# Patient Record
Sex: MALE | Race: WHITE | NOT HISPANIC OR LATINO | Employment: FULL TIME
[De-identification: names, ages, dates, MRNs, and addresses within clinical notes are randomized per-mention and may not be internally consistent; named-entity substitution may affect disease eponyms.]

---

## 2020-03-02 ENCOUNTER — HEALTH MAINTENANCE LETTER (OUTPATIENT)
Age: 40
End: 2020-03-02

## 2020-12-14 ENCOUNTER — HEALTH MAINTENANCE LETTER (OUTPATIENT)
Age: 40
End: 2020-12-14

## 2021-04-18 ENCOUNTER — HEALTH MAINTENANCE LETTER (OUTPATIENT)
Age: 41
End: 2021-04-18

## 2021-10-02 ENCOUNTER — HEALTH MAINTENANCE LETTER (OUTPATIENT)
Age: 41
End: 2021-10-02

## 2022-02-14 ENCOUNTER — TELEPHONE (OUTPATIENT)
Dept: FAMILY MEDICINE | Facility: CLINIC | Age: 42
End: 2022-02-14
Payer: COMMERCIAL

## 2022-02-14 NOTE — TELEPHONE ENCOUNTER
I assessed patient over the phone and he had a small amount of bloody sputum. The blood was separate from his saliva.  He has had a sore throat with a cough for a few days.  He suspects that blood was from irritated throat.  Cough is much better today.   No fever  No shortness of breath or chest pain.  He will go to urgent care if he has more blood when he coughs.  He was encourage to make an appt for a Covid 19 test.  He says he has not been exposed in the last few weeks.

## 2022-02-14 NOTE — TELEPHONE ENCOUNTER
Reason for call:  Patient reporting a symptom    Symptom or request: Coughing    Duration (how long have symptoms been present): Today, 2/14/22    Have you been treated for this before? No    Additional comments: Patient may have had Covid, wasn't tested since summer 2021 and hasnt seen a doctor in years and not vaccinated. Patient was coughing was really bad, but has gotten bettter. Patient went to cough, then had blood in mouth.    Phone Number patient can be reached at:  Home number on file [phone] (home)    Best Time:  Any    Can we leave a detailed message on this number:  YES    Call taken on 2/14/2022 at 4:12 PM by Cynthia Martinez

## 2022-05-14 ENCOUNTER — HEALTH MAINTENANCE LETTER (OUTPATIENT)
Age: 42
End: 2022-05-14

## 2022-09-03 ENCOUNTER — HEALTH MAINTENANCE LETTER (OUTPATIENT)
Age: 42
End: 2022-09-03

## 2023-01-23 ENCOUNTER — HOSPITAL ENCOUNTER (EMERGENCY)
Facility: CLINIC | Age: 43
Discharge: HOME | End: 2023-01-23
Attending: FAMILY MEDICINE | Admitting: FAMILY MEDICINE
Payer: COMMERCIAL

## 2023-01-23 ENCOUNTER — APPOINTMENT (OUTPATIENT)
Dept: CT IMAGING | Facility: CLINIC | Age: 43
End: 2023-01-23
Attending: FAMILY MEDICINE
Payer: COMMERCIAL

## 2023-01-23 VITALS
DIASTOLIC BLOOD PRESSURE: 99 MMHG | WEIGHT: 239.2 LBS | SYSTOLIC BLOOD PRESSURE: 168 MMHG | HEART RATE: 78 BPM | RESPIRATION RATE: 14 BRPM | BODY MASS INDEX: 34.24 KG/M2 | OXYGEN SATURATION: 94 % | TEMPERATURE: 96.8 F | HEIGHT: 70 IN

## 2023-01-23 DIAGNOSIS — I10 BENIGN ESSENTIAL HYPERTENSION: ICD-10-CM

## 2023-01-23 DIAGNOSIS — H81.10 BENIGN PAROXYSMAL POSITIONAL VERTIGO, UNSPECIFIED LATERALITY: ICD-10-CM

## 2023-01-23 LAB
ANION GAP SERPL CALCULATED.3IONS-SCNC: 13 MMOL/L (ref 7–15)
BASOPHILS # BLD AUTO: 0.1 10E3/UL (ref 0–0.2)
BASOPHILS NFR BLD AUTO: 1 %
BUN SERPL-MCNC: 14.7 MG/DL (ref 6–20)
CALCIUM SERPL-MCNC: 9.7 MG/DL (ref 8.6–10)
CHLORIDE SERPL-SCNC: 101 MMOL/L (ref 98–107)
CREAT SERPL-MCNC: 1.01 MG/DL (ref 0.67–1.17)
DEPRECATED HCO3 PLAS-SCNC: 27 MMOL/L (ref 22–29)
EOSINOPHIL # BLD AUTO: 0.2 10E3/UL (ref 0–0.7)
EOSINOPHIL NFR BLD AUTO: 2 %
ERYTHROCYTE [DISTWIDTH] IN BLOOD BY AUTOMATED COUNT: 12.3 % (ref 10–15)
GFR SERPL CREATININE-BSD FRML MDRD: >90 ML/MIN/1.73M2
GLUCOSE BLDC GLUCOMTR-MCNC: 124 MG/DL (ref 70–99)
GLUCOSE SERPL-MCNC: 126 MG/DL (ref 70–99)
HCT VFR BLD AUTO: 50.8 % (ref 40–53)
HGB BLD-MCNC: 17 G/DL (ref 13.3–17.7)
HOLD SPECIMEN: NORMAL
IMM GRANULOCYTES # BLD: 0 10E3/UL
IMM GRANULOCYTES NFR BLD: 0 %
LYMPHOCYTES # BLD AUTO: 1.9 10E3/UL (ref 0.8–5.3)
LYMPHOCYTES NFR BLD AUTO: 24 %
MCH RBC QN AUTO: 31.4 PG (ref 26.5–33)
MCHC RBC AUTO-ENTMCNC: 33.5 G/DL (ref 31.5–36.5)
MCV RBC AUTO: 94 FL (ref 78–100)
MONOCYTES # BLD AUTO: 0.6 10E3/UL (ref 0–1.3)
MONOCYTES NFR BLD AUTO: 8 %
NEUTROPHILS # BLD AUTO: 5.4 10E3/UL (ref 1.6–8.3)
NEUTROPHILS NFR BLD AUTO: 65 %
NRBC # BLD AUTO: 0 10E3/UL
NRBC BLD AUTO-RTO: 0 /100
PLATELET # BLD AUTO: 335 10E3/UL (ref 150–450)
POTASSIUM SERPL-SCNC: 3.6 MMOL/L (ref 3.4–5.3)
RBC # BLD AUTO: 5.42 10E6/UL (ref 4.4–5.9)
SODIUM SERPL-SCNC: 141 MMOL/L (ref 136–145)
WBC # BLD AUTO: 8.2 10E3/UL (ref 4–11)

## 2023-01-23 PROCEDURE — 99285 EMERGENCY DEPT VISIT HI MDM: CPT | Mod: 25 | Performed by: FAMILY MEDICINE

## 2023-01-23 PROCEDURE — 80048 BASIC METABOLIC PNL TOTAL CA: CPT | Performed by: FAMILY MEDICINE

## 2023-01-23 PROCEDURE — 82962 GLUCOSE BLOOD TEST: CPT

## 2023-01-23 PROCEDURE — 93005 ELECTROCARDIOGRAM TRACING: CPT | Performed by: FAMILY MEDICINE

## 2023-01-23 PROCEDURE — 250N000009 HC RX 250: Performed by: FAMILY MEDICINE

## 2023-01-23 PROCEDURE — 250N000011 HC RX IP 250 OP 636: Performed by: FAMILY MEDICINE

## 2023-01-23 PROCEDURE — 70450 CT HEAD/BRAIN W/O DYE: CPT

## 2023-01-23 PROCEDURE — 85025 COMPLETE CBC W/AUTO DIFF WBC: CPT | Performed by: FAMILY MEDICINE

## 2023-01-23 PROCEDURE — 36415 COLL VENOUS BLD VENIPUNCTURE: CPT | Performed by: FAMILY MEDICINE

## 2023-01-23 PROCEDURE — 93010 ELECTROCARDIOGRAM REPORT: CPT | Performed by: FAMILY MEDICINE

## 2023-01-23 PROCEDURE — 70498 CT ANGIOGRAPHY NECK: CPT

## 2023-01-23 PROCEDURE — 99284 EMERGENCY DEPT VISIT MOD MDM: CPT | Mod: 25 | Performed by: FAMILY MEDICINE

## 2023-01-23 PROCEDURE — 250N000013 HC RX MED GY IP 250 OP 250 PS 637: Performed by: FAMILY MEDICINE

## 2023-01-23 RX ORDER — MECLIZINE HYDROCHLORIDE 25 MG/1
50 TABLET ORAL ONCE
Status: COMPLETED | OUTPATIENT
Start: 2023-01-23 | End: 2023-01-23

## 2023-01-23 RX ORDER — CLONIDINE HYDROCHLORIDE 0.1 MG/1
0.1 TABLET ORAL ONCE
Status: COMPLETED | OUTPATIENT
Start: 2023-01-23 | End: 2023-01-23

## 2023-01-23 RX ORDER — MECLIZINE HYDROCHLORIDE 25 MG/1
25-50 TABLET ORAL 3 TIMES DAILY PRN
Qty: 20 TABLET | Refills: 0 | Status: SHIPPED | OUTPATIENT
Start: 2023-01-23

## 2023-01-23 RX ORDER — IOPAMIDOL 755 MG/ML
68 INJECTION, SOLUTION INTRAVASCULAR ONCE
Status: COMPLETED | OUTPATIENT
Start: 2023-01-23 | End: 2023-01-23

## 2023-01-23 RX ORDER — LOSARTAN POTASSIUM 25 MG/1
25 TABLET ORAL DAILY
Qty: 30 TABLET | Refills: 0 | Status: SHIPPED | OUTPATIENT
Start: 2023-01-23 | End: 2023-01-31

## 2023-01-23 RX ADMIN — SODIUM CHLORIDE 100 ML: 9 INJECTION, SOLUTION INTRAVENOUS at 08:25

## 2023-01-23 RX ADMIN — MECLIZINE HYDROCHLORIDE 50 MG: 25 TABLET ORAL at 09:33

## 2023-01-23 RX ADMIN — CLONIDINE HYDROCHLORIDE 0.1 MG: 0.1 TABLET ORAL at 10:54

## 2023-01-23 RX ADMIN — IOPAMIDOL 68 ML: 755 INJECTION, SOLUTION INTRAVENOUS at 08:25

## 2023-01-23 ASSESSMENT — ACTIVITIES OF DAILY LIVING (ADL)
ADLS_ACUITY_SCORE: 35
ADLS_ACUITY_SCORE: 35

## 2023-01-23 ASSESSMENT — VISUAL ACUITY: OU: BLURRED VISION

## 2023-01-23 NOTE — ED TRIAGE NOTES
Pt here with dizziness with moving his head since yesterday morning around 0930. Pt also reports nausea and elevated BP. BP in triage 190/107. AARON yesterday morning which has resolved.      Triage Assessment     Row Name 01/23/23 0756       Triage Assessment (Adult)    Airway WDL WDL       Cardiac WDL    Cardiac WDL WDL       Cognitive/Neuro/Behavioral WDL    Cognitive/Neuro/Behavioral WDL WDL

## 2023-01-23 NOTE — ED PROVIDER NOTES
History     Chief Complaint   Patient presents with     Dizziness     Started yesterday morning, pt is dizzy when he turns his head     Nausea     HPI  Titi Lobo is a 42 year old male, past medical history is unremarkable, presents to the emergency department concerns of dizziness beginning approximately 24 hours prior to presentation and nausea.  History is obtained from the patient who presents with concerns of room spinning vertigo with head movement beginning yesterday morning shortly after awakening.  The patient states that prior to yesterday morning he went to bed feeling okay.  He awoke with sensation of off-balance room spinning with any head movement side to side and also sensation of his eyes twitching when this happens.  He remains completely at rest is usually not present.  Yesterday morning when it started it was associated with a right periorbital headache that was sharp and piercing that resolved in a few hours without intervention.  There was some nausea at that time that improved.  The patient notes that he checked his blood pressure yesterday at home and found it was over 160 systolic and about 110 diastolic.  He has been told he has had elevated blood pressure without a formal diagnosis of hypertension in the past.  That was many years ago.  At no point was there any chest pain tightness of breath or shortness of air.    Allergies:  No Known Allergies    Problem List:    Patient Active Problem List    Diagnosis Date Noted     Elevated blood pressure reading without diagnosis of hypertension 11/10/2016     Priority: Medium        Past Medical History:    No past medical history on file.    Past Surgical History:    No past surgical history on file.    Family History:    Family History   Problem Relation Age of Onset     Coronary Artery Disease Mother      Hypertension Mother      Hypertension Father      Hypertension Brother      Diabetes Maternal Uncle      Hypertension Maternal Uncle   "    Coronary Artery Disease Maternal Uncle        Social History:  Marital Status:   [2]  Social History     Tobacco Use     Smoking status: Never     Smokeless tobacco: Never   Substance Use Topics     Alcohol use: Yes     Comment: 4-6 beers on weekends     Drug use: No        Medications:    losartan (COZAAR) 25 MG tablet  meclizine (ANTIVERT) 25 MG tablet  Omeprazole (PRILOSEC PO)          Review of Systems   All other systems reviewed and are negative.      Physical Exam   BP: (!) 190/107  Pulse: 67  Temp: 96.8  F (36  C)  Resp: 18  Height: 177.8 cm (5' 10\")  Weight: 108.5 kg (239 lb 3.2 oz)  SpO2: 96 %      Physical Exam  Vitals and nursing note reviewed.   Constitutional:       General: He is not in acute distress.     Appearance: Normal appearance. He is not ill-appearing.   HENT:      Head: Normocephalic and atraumatic.      Comments: The patient has 2 or 3 beats of nystagmus with rotation of the head to the right nystagmus in that direction.     Right Ear: Tympanic membrane, ear canal and external ear normal.      Left Ear: Tympanic membrane, ear canal and external ear normal.      Nose: Nose normal.      Mouth/Throat:      Mouth: Mucous membranes are dry.      Pharynx: Oropharynx is clear.   Eyes:      Extraocular Movements: Extraocular movements intact.      Conjunctiva/sclera: Conjunctivae normal.      Pupils: Pupils are equal, round, and reactive to light.   Cardiovascular:      Rate and Rhythm: Normal rate and regular rhythm.      Pulses: Normal pulses.      Heart sounds: Normal heart sounds.   Pulmonary:      Effort: Pulmonary effort is normal.   Abdominal:      General: Bowel sounds are normal.      Palpations: Abdomen is soft.   Musculoskeletal:         General: Normal range of motion.      Cervical back: Normal range of motion and neck supple.   Skin:     General: Skin is warm and dry.      Capillary Refill: Capillary refill takes less than 2 seconds.   Neurological:      General: No focal " deficit present.      Mental Status: He is alert and oriented to person, place, and time.   Psychiatric:         Mood and Affect: Mood normal.         Behavior: Behavior normal.         ED Course                 Procedures              EKG Interpretation:      Interpreted by Matt Navarro MD  Time reviewed: Time obtained 806 time interpreted same.  65 bpm sinus rhythm no acute ST-T wave changes.  Normal axis and normal intervals.          Critical Care time:  none               Results for orders placed or performed during the hospital encounter of 01/23/23 (from the past 24 hour(s))   Glucose by meter   Result Value Ref Range    GLUCOSE BY METER POCT 124 (H) 70 - 99 mg/dL   [place] Draw    Narrative    The following orders were created for panel order [place] Draw.  Procedure                               Abnormality         Status                     ---------                               -----------         ------                     Extra Blue Top Tube[[id number]]                              Final result               Extra Red Top Tube[[id number]]                               Final result               Extra Green Top (Lithium...[[id number]]                      Final result               Extra Purple Top Tube[[id number]]                            Final result                 Please view results for these tests on the individual orders.   CBC with platelets, differential    Narrative    The following orders were created for panel order CBC with platelets, differential.  Procedure                               Abnormality         Status                     ---------                               -----------         ------                     CBC with platelets and d...[[id number]]                      Final result                 Please view results for these tests on the individual orders.   Basic metabolic panel   Result Value Ref Range    Sodium 141 136 - 145 mmol/L    Potassium 3.6 3.4 - 5.3 mmol/L     Chloride 101 98 - 107 mmol/L    Carbon Dioxide (CO2) 27 22 - 29 mmol/L    Anion Gap 13 7 - 15 mmol/L    Urea Nitrogen 14.7 6.0 - 20.0 mg/dL    Creatinine 1.01 0.67 - 1.17 mg/dL    Calcium 9.7 8.6 - 10.0 mg/dL    Glucose 126 (H) 70 - 99 mg/dL    GFR Estimate >90 >60 mL/min/1.73m2   Extra Blue Top Tube   Result Value Ref Range    Hold Specimen JIC    Extra Red Top Tube   Result Value Ref Range    Hold Specimen JIC    Extra Green Top (Lithium Heparin) Tube   Result Value Ref Range    Hold Specimen JIC    Extra Purple Top Tube   Result Value Ref Range    Hold Specimen JIC    CBC with platelets and differential   Result Value Ref Range    WBC Count 8.2 4.0 - 11.0 10e3/uL    RBC Count 5.42 4.40 - 5.90 10e6/uL    Hemoglobin 17.0 13.3 - 17.7 g/dL    Hematocrit 50.8 40.0 - 53.0 %    MCV 94 78 - 100 fL    MCH 31.4 26.5 - 33.0 pg    MCHC 33.5 31.5 - 36.5 g/dL    RDW 12.3 10.0 - 15.0 %    Platelet Count 335 150 - 450 10e3/uL    % Neutrophils 65 %    % Lymphocytes 24 %    % Monocytes 8 %    % Eosinophils 2 %    % Basophils 1 %    % Immature Granulocytes 0 %    NRBCs per 100 WBC 0 <1 /100    Absolute Neutrophils 5.4 1.6 - 8.3 10e3/uL    Absolute Lymphocytes 1.9 0.8 - 5.3 10e3/uL    Absolute Monocytes 0.6 0.0 - 1.3 10e3/uL    Absolute Eosinophils 0.2 0.0 - 0.7 10e3/uL    Absolute Basophils 0.1 0.0 - 0.2 10e3/uL    Absolute Immature Granulocytes 0.0 <=0.4 10e3/uL    Absolute NRBCs 0.0 10e3/uL   CT Head w/o Contrast    Narrative    CT SCAN OF THE HEAD WITHOUT CONTRAST   1/23/2023 8:42 AM     HISTORY: Acute headache (< 3 months), no complicating features.    TECHNIQUE:  Axial images of the head and coronal reformations without  IV contrast material. Radiation dose for this scan was reduced using  automated exposure control, adjustment of the mA and/or kV according  to patient size, or iterative reconstruction technique.    COMPARISON: None.    FINDINGS: There is no evidence of intracranial hemorrhage, mass, acute  infarct or anomaly.  The ventricles are normal in size, shape and  configuration. The brain parenchyma and subarachnoid spaces are  normal.     The visualized portions of the sinuses and mastoids appear normal. The  bony calvarium and bones of the skull base appear intact.       Impression    IMPRESSION:   No evidence of acute intracranial hemorrhage, mass, or  herniation.    TUAN ORDOÑEZ MD         SYSTEM ID:  [id number]   CTA Head Neck with Contrast    Narrative    CT ANGIOGRAM OF THE HEAD AND NECK WITH CONTRAST  1/23/2023 8:43 AM     HISTORY: Acute headache (< 3 months), no complicating features     TECHNIQUE:  CT angiography with an injection of 68 mL Isovue 370 IV  with scans through the head and neck. Images were transferred to a  separate 3-D workstation where multiplanar reformations and 3-D images  were created. Estimates of carotid stenoses are made relative to the  distal internal carotid artery diameters except as noted. Radiation  dose for this scan was reduced using automated exposure control,  adjustment of the mA and/or kV according to patient size, or iterative  reconstruction technique.    COMPARISON: CT head same day.     CT HEAD FINDINGS: No contrast enhancing lesions. Cerebral blood flow  is grossly normal.     CT ANGIOGRAM HEAD FINDINGS:  The major intracranial arteries including  the proximal branches of the anterior cerebral, middle cerebral, and  posterior cerebral arteries appear patent without vascular cutoff. No  aneurysm or high flow vascular malformation identified. The origin of  the right posterior cerebral artery from the anterior circulation,  normal variant. No high-grade proximal arterial stenosis or large  vessel occlusion. Venous circulation is unremarkable.     CT ANGIOGRAM NECK FINDINGS:   Common origin of the brachiocephalic and left common carotid arteries  from the aortic arch, a normal variant. No stenosis at the origins of  the great vessels.    Right carotid artery: The right common and  internal carotid arteries  are patent. Mild atherosclerotic disease at the carotid bifurcation  and proximal internal carotid artery without significant stenosis by  NASCET criteria.     Left carotid artery: The left common and internal carotid arteries are  patent. Mild atherosclerotic disease at the carotid bifurcation and  proximal internal carotid artery without significant stenosis by  NASCET criteria.     Vertebral arteries: Vertebral arteries are patent without evidence of  dissection. No significant stenosis.     Other findings: Degenerative changes are noted in the visualized  spine.       Impression    IMPRESSION:  1. No high-grade stenosis or large vessel occlusion of the major  intracranial arteries.  2. No intracranial aneurysm or high flow vascular malformation.  3. Mild bilateral carotid bifurcation atherosclerosis without  significant stenosis.  4. Patent cervical vertebral arteries without significant stenosis or  evidence for dissection.    TUAN ORDOÑEZ MD         SYSTEM ID:  [id number]     9:10 AM  No significant lab abnormals and imaging is negative.  We will try 50 mg oral Antivert and observe.    12:46 PM  Definite improvement in vertigo symptoms with the use of the Antivert.  Blood pressure and gradual improvement with the clonidine.  Discussed all diagnostic test resulted during the time of the ER visit.  No concerning findings acutely.  This patient will likely require ongoing blood pressure control and have initiated losartan 25 mg p.o. daily and recommended establishment with primary care and for follow-up of elevated blood pressure in the next 1 to 2 weeks.  I expect that this will need some dose adjustment.  With respect to the vertigo the Antivert has been effective and I will give the patient a prescription for that.  I suspect that this vertigo has a peripheral cause and likely represents BPPV.  Return criteria for the emergency department were discussed    Medications   iopamidol  (ISOVUE-370) solution 68 mL (68 mLs Intravenous Given 1/23/23 0825)   sodium chloride 0.9 % bag 500mL for CT scan flush use (100 mLs Intravenous Given 1/23/23 0825)   meclizine (ANTIVERT) tablet 50 mg (50 mg Oral Given 1/23/23 0933)   cloNIDine (CATAPRES) tablet 0.1 mg (0.1 mg Oral Not Given 1/23/23 1042)   cloNIDine (CATAPRES) tablet 0.1 mg (0.1 mg Oral Given 1/23/23 1054)       Assessments & Plan (with Medical Decision Making)   Assessments and plan with medical decision making at the time stamp above.      Disclaimer: This note consists of symbols derived from keyboarding, dictation and/or voice recognition software. As a result, there may be errors in the script that have gone undetected. Please consider this when interpreting information found in this chart.      I have reviewed the nursing notes.    I have reviewed the findings, diagnosis, plan and need for follow up with the patient.           New Prescriptions    LOSARTAN (COZAAR) 25 MG TABLET    Take 1 tablet (25 mg) by mouth daily for 30 days    MECLIZINE (ANTIVERT) 25 MG TABLET    Take 1-2 tablets (25-50 mg) by mouth 3 times daily as needed for dizziness       Final diagnoses:   Benign paroxysmal positional vertigo, unspecified laterality   Benign essential hypertension       1/23/2023   [facility] EMERGENCY DEPT     Matt Navarro MD  01/23/23 5516

## 2023-01-23 NOTE — DISCHARGE INSTRUCTIONS
Losartan 25 mg p.o. daily to start for your blood pressure.  As we discussed this will likely need to be adjusted.  Will need to follow-up in clinic to establish care and for management of your blood pressure in the next 1 to 2 weeks.  I would recommend Dr. Bert Jose and contact information given at discharge.  Antivert as needed for symptom management of the BPPV.  Return to the emergency department if worse or changes.

## 2023-01-23 NOTE — ED NOTES
Pt woke up at 0630 yesterday with vertigo and a headache.  Stayed in bed much of the day.  Checked his own Blood pressure at home, stated it was elevated. Pt complains of nausea without vomiting.  Left shoulder pain noticed this morning upon waking, attributes to laying in bed most of day yesterday.

## 2023-01-31 ENCOUNTER — OFFICE VISIT (OUTPATIENT)
Dept: FAMILY MEDICINE | Facility: CLINIC | Age: 43
End: 2023-01-31
Payer: COMMERCIAL

## 2023-01-31 VITALS
WEIGHT: 235.8 LBS | DIASTOLIC BLOOD PRESSURE: 80 MMHG | BODY MASS INDEX: 33.76 KG/M2 | HEART RATE: 69 BPM | RESPIRATION RATE: 16 BRPM | OXYGEN SATURATION: 97 % | SYSTOLIC BLOOD PRESSURE: 116 MMHG | HEIGHT: 70 IN | TEMPERATURE: 96.3 F

## 2023-01-31 DIAGNOSIS — Z11.59 NEED FOR HEPATITIS C SCREENING TEST: ICD-10-CM

## 2023-01-31 DIAGNOSIS — R73.09 ELEVATED GLUCOSE: ICD-10-CM

## 2023-01-31 DIAGNOSIS — H81.10 BENIGN PAROXYSMAL POSITIONAL VERTIGO, UNSPECIFIED LATERALITY: ICD-10-CM

## 2023-01-31 DIAGNOSIS — Z13.220 SCREENING FOR HYPERLIPIDEMIA: ICD-10-CM

## 2023-01-31 DIAGNOSIS — Z11.4 SCREENING FOR HIV (HUMAN IMMUNODEFICIENCY VIRUS): ICD-10-CM

## 2023-01-31 DIAGNOSIS — I10 BENIGN ESSENTIAL HYPERTENSION: Primary | ICD-10-CM

## 2023-01-31 PROCEDURE — 99204 OFFICE O/P NEW MOD 45 MIN: CPT | Performed by: FAMILY MEDICINE

## 2023-01-31 RX ORDER — LOSARTAN POTASSIUM 25 MG/1
25 TABLET ORAL DAILY
Qty: 90 TABLET | Refills: 3 | Status: SHIPPED | OUTPATIENT
Start: 2023-01-31

## 2023-01-31 ASSESSMENT — PAIN SCALES - GENERAL: PAINLEVEL: NO PAIN (0)

## 2023-01-31 NOTE — PATIENT INSTRUCTIONS
Be consistent with low salt, low trans fat and low saturated fat diet.  Eat food rich in omega-3-fatty acids as you tolerate. (salmon, olive oil)  Eat 5 cups of vegetables, fruits and whole grains per day.  Limit starchy food (white rice, white bread, white pasta, white potatoes) to less than a cup per meal.  Minimize sweets, junk food and fastfood. Limit soda beverages to one serving per day; best to avoid it altogether though.    Exercise: moderate intensity sustained for at least 30 mins per episode, goal of 150 mins per week at least  Combine cardiovascular and resistance exercises.  These exercise recommendations are in addition to your daily activity at work or home.  Work on losing weight.    Schedule fasting lab appointment.    Think about screening for hepatitis C and HIV infection.    Schedule wellness visit within the next 2 months or so.

## 2023-01-31 NOTE — PROGRESS NOTES
"  Assessment & Plan     Benign essential hypertension  Controlled.  Low salt, low fat diet.   Exercise as tolerated.  Take meds as prescribed; call if with side effects.   - losartan (COZAAR) 25 MG tablet  Dispense: 90 tablet; Refill: 3    Benign paroxysmal positional vertigo, unspecified laterality  Resolved per patient.  No additional treawtment for now.    Elevated glucose  Verified with patient his prandial status on the ER visit.   Will verify level together with A1c on a fasting lab appointment.  - Glucose  - Hemoglobin A1c    Screening for HIV (human immunodeficiency virus)  Need for hepatitis C screening test  Patient will consider this for his wellness exam    Screening for hyperlipidemia  - Lipid panel reflex to direct LDL Fasting   MED REC REQUIRED  Post Medication Reconciliation Status: discharge medications reconciled, continue medications without change  BMI:   Estimated body mass index is 34.32 kg/m  as calculated from the following:    Height as of this encounter: 1.765 m (5' 9.5\").    Weight as of this encounter: 107 kg (235 lb 12.8 oz).   Weight management plan: Discussed healthy diet and exercise guidelines    Patient Instructions   Be consistent with low salt, low trans fat and low saturated fat diet.  Eat food rich in omega-3-fatty acids as you tolerate. (salmon, olive oil)  Eat 5 cups of vegetables, fruits and whole grains per day.  Limit starchy food (white rice, white bread, white pasta, white potatoes) to less than a cup per meal.  Minimize sweets, junk food and fastfood. Limit soda beverages to one serving per day; best to avoid it altogether though.    Exercise: moderate intensity sustained for at least 30 mins per episode, goal of 150 mins per week at least  Combine cardiovascular and resistance exercises.  These exercise recommendations are in addition to your daily activity at work or home.  Work on losing weight.    Schedule fasting lab appointment.    Think about screening for " "hepatitis C and HIV infection.    Schedule wellness visit within the next 2 months or so.      Return in about 2 months (around 3/31/2023) for In-clinic visit for wellness exam.    Bert Jose MD  [facility] HospitalMARIA[facility] Walls is a 42 year old, presenting for the following health issues:  ER F/U (Pt being seen for post e/r follow up.) and Results (Pt has questions regarding his lab results in e/r.  Also would like to get his lipid profile checked at some time.  Not fasting today.)      HPI     ED/UC Followup:    Facility:  [facility]  Date of visit: 1/23/23  Reason for visit: dizziness, elevated b/p  Current Status: pt doing better    Hypertension Follow-up      Do you check your blood pressure regularly outside of the clinic? No     Are you following a low salt diet? No    Are your blood pressures ever more than 140 on the top number (systolic) OR more   than 90 on the bottom number (diastolic), for example 140/90? N/a  Denies chest pain, dyspnea, HA, BOV,  or urinary changes.  Tolerating losartan well per patient.    Patient had glucose of 126 in the ER - he said he had not had any food 8 hours before that blood draw.  No hx of diabetes per patient.    Review of Systems   Constitutional, HEENT, cardiovascular, pulmonary, GI, , musculoskeletal, neuro, skin, endocrine and psych systems are negative, except as otherwise noted.      Objective    /80   Pulse 69   Temp (!) 96.3  F (35.7  C) (Tympanic)   Resp 16   Ht 1.765 m (5' 9.5\")   Wt 107 kg (235 lb 12.8 oz)   SpO2 97%   BMI 34.32 kg/m    Body mass index is 34.32 kg/m .  Physical Exam   GENERAL: obese, alert and no distress, ambulatory w/o assist  NECK: no tenderness, no adenopathy,  Thyroid not enlarged  RESP: lungs clear to auscultation - no rales, no rhonchi, no wheezes  CV: regular rates and rhythm, no murmur  MS: no edema  SKIN: no suspicious lesions, no rashes; multiple tattoos.  NEURO: strength and tone- " normal, sensory exam- grossly normal, mentation- intact, speech- normal, reflexes- symmetric  ABD:  nontender    Admission on 01/23/2023, Discharged on 01/23/2023   Component Date Value Ref Range Status     Sodium 01/23/2023 141  136 - 145 mmol/L Final     Potassium 01/23/2023 3.6  3.4 - 5.3 mmol/L Final     Chloride 01/23/2023 101  98 - 107 mmol/L Final     Carbon Dioxide (CO2) 01/23/2023 27  22 - 29 mmol/L Final     Anion Gap 01/23/2023 13  7 - 15 mmol/L Final     Urea Nitrogen 01/23/2023 14.7  6.0 - 20.0 mg/dL Final     Creatinine 01/23/2023 1.01  0.67 - 1.17 mg/dL Final     Calcium 01/23/2023 9.7  8.6 - 10.0 mg/dL Final     Glucose 01/23/2023 126 (H)  70 - 99 mg/dL Final     GFR Estimate 01/23/2023 >90  >60 mL/min/1.73m2 Final    Effective December 21, 2021 eGFRcr in adults is calculated using the 2021 CKD-EPI creatinine equation which includes age and gender (Bennie et al., [place], DOI: 10.1056/JVPFlh2611874)     Hold Specimen 01/23/2023 [facility]   Final     Hold Specimen 01/23/2023 [facility]   Final     Hold Specimen 01/23/2023 [facility]   Final     Hold Specimen 01/23/2023 [facility]   Final     WBC Count 01/23/2023 8.2  4.0 - 11.0 10e3/uL Final     RBC Count 01/23/2023 5.42  4.40 - 5.90 10e6/uL Final     Hemoglobin 01/23/2023 17.0  13.3 - 17.7 g/dL Final     Hematocrit 01/23/2023 50.8  40.0 - 53.0 % Final     MCV 01/23/2023 94  78 - 100 fL Final     MCH 01/23/2023 31.4  26.5 - 33.0 pg Final     MCHC 01/23/2023 33.5  31.5 - 36.5 g/dL Final     RDW 01/23/2023 12.3  10.0 - 15.0 % Final     Platelet Count 01/23/2023 335  150 - 450 10e3/uL Final     % Neutrophils 01/23/2023 65  % Final     % Lymphocytes 01/23/2023 24  % Final     % Monocytes 01/23/2023 8  % Final     % Eosinophils 01/23/2023 2  % Final     % Basophils 01/23/2023 1  % Final     % Immature Granulocytes 01/23/2023 0  % Final     NRBCs per 100 WBC 01/23/2023 0  <1 /100 Final     Absolute Neutrophils 01/23/2023 5.4  1.6 - 8.3 10e3/uL Final     Absolute Lymphocytes 01/23/2023  1.9  0.8 - 5.3 10e3/uL Final     Absolute Monocytes 01/23/2023 0.6  0.0 - 1.3 10e3/uL Final     Absolute Eosinophils 01/23/2023 0.2  0.0 - 0.7 10e3/uL Final     Absolute Basophils 01/23/2023 0.1  0.0 - 0.2 10e3/uL Final     Absolute Immature Granulocytes 01/23/2023 0.0  <=0.4 10e3/uL Final     Absolute NRBCs 01/23/2023 0.0  10e3/uL Final     GLUCOSE BY METER POCT 01/23/2023 124 (H)  70 - 99 mg/dL Final

## 2023-02-08 ENCOUNTER — LAB (OUTPATIENT)
Dept: LAB | Facility: CLINIC | Age: 43
End: 2023-02-08
Payer: COMMERCIAL

## 2023-02-08 DIAGNOSIS — Z11.4 SCREENING FOR HIV (HUMAN IMMUNODEFICIENCY VIRUS): ICD-10-CM

## 2023-02-08 DIAGNOSIS — R73.09 ELEVATED GLUCOSE: ICD-10-CM

## 2023-02-08 DIAGNOSIS — Z13.220 SCREENING FOR HYPERLIPIDEMIA: ICD-10-CM

## 2023-02-08 DIAGNOSIS — Z11.59 NEED FOR HEPATITIS C SCREENING TEST: ICD-10-CM

## 2023-02-08 LAB
CHOLEST SERPL-MCNC: 238 MG/DL
FASTING STATUS PATIENT QL REPORTED: YES
GLUCOSE SERPL-MCNC: 104 MG/DL (ref 70–99)
HBA1C MFR BLD: 5.5 % (ref 0–5.6)
HDLC SERPL-MCNC: 58 MG/DL
LDLC SERPL CALC-MCNC: 146 MG/DL
NONHDLC SERPL-MCNC: 180 MG/DL
TRIGL SERPL-MCNC: 169 MG/DL

## 2023-02-08 PROCEDURE — 86803 HEPATITIS C AB TEST: CPT

## 2023-02-08 PROCEDURE — 83036 HEMOGLOBIN GLYCOSYLATED A1C: CPT

## 2023-02-08 PROCEDURE — 87389 HIV-1 AG W/HIV-1&-2 AB AG IA: CPT

## 2023-02-08 PROCEDURE — 82947 ASSAY GLUCOSE BLOOD QUANT: CPT

## 2023-02-08 PROCEDURE — 36415 COLL VENOUS BLD VENIPUNCTURE: CPT

## 2023-02-08 PROCEDURE — 80061 LIPID PANEL: CPT

## 2023-02-09 LAB
HCV AB SERPL QL IA: NONREACTIVE
HIV 1+2 AB+HIV1 P24 AG SERPL QL IA: NONREACTIVE

## 2023-06-03 ENCOUNTER — HEALTH MAINTENANCE LETTER (OUTPATIENT)
Age: 43
End: 2023-06-03

## 2024-07-06 ENCOUNTER — HEALTH MAINTENANCE LETTER (OUTPATIENT)
Age: 44
End: 2024-07-06

## 2025-07-13 ENCOUNTER — HEALTH MAINTENANCE LETTER (OUTPATIENT)
Age: 45
End: 2025-07-13